# Patient Record
Sex: FEMALE | Race: ASIAN | NOT HISPANIC OR LATINO | Employment: OTHER | URBAN - METROPOLITAN AREA
[De-identification: names, ages, dates, MRNs, and addresses within clinical notes are randomized per-mention and may not be internally consistent; named-entity substitution may affect disease eponyms.]

---

## 2017-09-15 ENCOUNTER — HOSPITAL ENCOUNTER (EMERGENCY)
Facility: CLINIC | Age: 62
Discharge: HOME | End: 2017-09-15
Attending: EMERGENCY MEDICINE | Admitting: EMERGENCY MEDICINE
Payer: COMMERCIAL

## 2017-09-15 ENCOUNTER — TELEPHONE (OUTPATIENT)
Dept: OBGYN | Facility: CLINIC | Age: 62
End: 2017-09-15

## 2017-09-15 VITALS
SYSTOLIC BLOOD PRESSURE: 137 MMHG | HEIGHT: 60 IN | TEMPERATURE: 98.3 F | HEART RATE: 76 BPM | WEIGHT: 123 LBS | DIASTOLIC BLOOD PRESSURE: 70 MMHG | BODY MASS INDEX: 24.15 KG/M2 | RESPIRATION RATE: 16 BRPM | OXYGEN SATURATION: 96 %

## 2017-09-15 DIAGNOSIS — N76.4 LABIAL ABSCESS: ICD-10-CM

## 2017-09-15 PROCEDURE — 99284 EMERGENCY DEPT VISIT MOD MDM: CPT | Mod: 25

## 2017-09-15 PROCEDURE — 93005 ELECTROCARDIOGRAM TRACING: CPT

## 2017-09-15 PROCEDURE — 87077 CULTURE AEROBIC IDENTIFY: CPT | Performed by: EMERGENCY MEDICINE

## 2017-09-15 PROCEDURE — 87186 SC STD MICRODIL/AGAR DIL: CPT | Performed by: EMERGENCY MEDICINE

## 2017-09-15 PROCEDURE — 87070 CULTURE OTHR SPECIMN AEROBIC: CPT | Performed by: EMERGENCY MEDICINE

## 2017-09-15 PROCEDURE — 10060 I&D ABSCESS SIMPLE/SINGLE: CPT

## 2017-09-15 PROCEDURE — 25000128 H RX IP 250 OP 636: Performed by: EMERGENCY MEDICINE

## 2017-09-15 RX ORDER — LIDOCAINE HYDROCHLORIDE AND EPINEPHRINE 10; 10 MG/ML; UG/ML
INJECTION, SOLUTION INFILTRATION; PERINEURAL
Status: DISCONTINUED
Start: 2017-09-15 | End: 2017-09-15 | Stop reason: HOSPADM

## 2017-09-15 RX ORDER — AMPICILLIN AND SULBACTAM 2; 1 G/1; G/1
3 INJECTION, POWDER, FOR SOLUTION INTRAMUSCULAR; INTRAVENOUS ONCE
Status: COMPLETED | OUTPATIENT
Start: 2017-09-15 | End: 2017-09-15

## 2017-09-15 RX ORDER — OXYCODONE AND ACETAMINOPHEN 5; 325 MG/1; MG/1
1-2 TABLET ORAL EVERY 6 HOURS PRN
Qty: 20 TABLET | Refills: 0 | Status: SHIPPED | OUTPATIENT
Start: 2017-09-15 | End: 2017-09-22

## 2017-09-15 RX ORDER — POLYETHYLENE GLYCOL 3350 17 G/17G
1 POWDER, FOR SOLUTION ORAL DAILY
Qty: 527 G | Refills: 0 | Status: SHIPPED | OUTPATIENT
Start: 2017-09-15 | End: 2017-09-22

## 2017-09-15 RX ADMIN — AMPICILLIN SODIUM AND SULBACTAM SODIUM 3 G: 2; 1 INJECTION, POWDER, FOR SOLUTION INTRAMUSCULAR; INTRAVENOUS at 15:09

## 2017-09-15 ASSESSMENT — ENCOUNTER SYMPTOMS
WOUND: 1
FEVER: 0
ABDOMINAL PAIN: 0
DYSURIA: 0

## 2017-09-15 NOTE — ED NOTES
Pt has swollen area on left side of perineum for past week.  She was started on Cephalexin on Monday and the area continues to get worse.

## 2017-09-15 NOTE — LETTER
To Whom it may concern:      Hannah Jacome was seen in our Emergency Department today, 09/15/17.  I expect her condition to improve over the next 3 days.  she may return to work/school when improved.  Excuse from work 9-18-17 for medical reason.    Sincerely,  Griselda Hare RN

## 2017-09-15 NOTE — ED PROVIDER NOTES
History     Chief Complaint:  OBG    HPI   Hannah Jacome is a 62 year old female who presents to the emergency department today for evaluation of a swollen area/abscess on left side of her perineum, which was first noticed about one week ago. She was started on Cephalexin on Monday (4 days ago) and the area has continued to get worse. The patient reports that she has never had this before and denies ever having a skin infection in the past. She did have a fever on Monday, but has not had since then. The vaginal area is noted to be painful. No dysuria. No vaginal bleeding. No abdominal pain.    Allergies:  No Known Drug Allergies      Medications:    CEPHALEXIN PO  LISINOPRIL PO  ASPIRIN PO    Past Medical History:    Hypertension  No history of diabetes    Past Surgical History:     x 2  Hysterectomy  Cataract removal  Colonoscopy x 2    Family History:    History reviewed. No pertinent family history.      Social History:  The patient was accompanied to the ED alone.  Smoking Status: never  Alcohol Use: Yes   Marital Status:   [4]     Review of Systems   Constitutional: Negative for fever.   Gastrointestinal: Negative for abdominal pain.   Genitourinary: Positive for vaginal pain. Negative for dysuria and vaginal bleeding.   Skin: Positive for wound (abscess on perineum).   All other systems reviewed and are negative.    Physical Exam     Patient Vitals for the past 24 hrs:   BP Temp Temp src Pulse Resp SpO2 Height Weight   09/15/17 1706 - - - - - 97 % - -   09/15/17 1705 - - - - - 96 % - -   09/15/17 1704 - - - - - 95 % - -   09/15/17 1703 - - - - - 96 % - -   09/15/17 1702 - - - - - 96 % - -   09/15/17 1701 - - - - - 96 % - -   09/15/17 1700 138/71 - - - - 99 % - -   09/15/17 1659 - - - - - 95 % - -   09/15/17 1658 - - - - - 96 % - -   09/15/17 1657 - - - - - 96 % - -   09/15/17 1656 - - - - - 97 % - -   09/15/17 1655 - - - - - 97 % - -   09/15/17 1654 - - - - - 96 % - -   09/15/17 1653 137/71 -  - - - 99 % - -   09/15/17 1436 159/81 98  F (36.7  C) Oral 76 16 99 % 1.524 m (5') 55.8 kg (123 lb)   09/15/17 1357 - - - - - 98 % - -      Physical Exam  General: Patient is alert and interactive when I enter the room  Head:  The scalp, face, and head appear normal  Eyes:  Conjunctivae are normal  ENT:    The nose is normal    Pinnae are normal    External acoustic canals are normal  Neck:  Trachea midline  CV:  Pulses are normal.    Resp:  No respiratory distress   Abdomen:      Soft, non-tender, non-distended  Musc:  Normal muscular tone    No major joint effusions    No asymmetric leg swelling    Good capillary refill noted  Skin:  No rash or lesions noted  Neuro:  Speech is normal and fluent. Face is symmetric.     Moving all extremities well.   : large left labial abscess with fluctuance noted and surrounding purulent drainage   Psych: Awake. Alert.  Normal affect.  Appropriate interactions.          Emergency Department Course     Procedures:    Procedure: Incision and Drainage     LOCATIONS:  Left labia majora    ANESTHESIA:  Local field block using Lidocaine 1% with epinephrine, total of 2 mLs    PREPARATION:  Cleansed with Betadine    PROCEDURE:  Area was incised with # 11 Blade ([place]) with a Single Straight incision.  Wound treatment included Purulent Drainage (copoious amounts)  Packing consisted of No Packing.  Appropriate dressing was applied to cover the area.    Patient Status: Patient tolerated the procedure well. There were no complications.      Interventions:  1509 Unasyn 3 g IV     Emergency Department Course:  Nursing notes and vitals reviewed.  1419 I entered the room.  1423 I performed an exam of the patient as documented above.   The patient received the above intervention(s).    1439 I spoke with Dr. Wiggins of the OB GYN service regarding patient's presentation and plan of care.   1508 aspiration procedure performed as noted above.   1622 I spoke with home healthcare regarding the  patient's plan of care. They feel that the patient can go home and return for wound check instead of home healthcare.   1711 the patient was rechecked and she was updated on her plan of care.    I discussed the treatment plan with the patient. They expressed understanding of this plan and consented to discharge. They will be discharged home with instructions for care and follow up. In addition, the patient will return to the emergency department if their symptoms persist, worsen, if new symptoms arise or if there is any concern.  All questions were answered.     Impression & Plan      Medical Decision Making:  Hannah Jacome is a 62 year old female who presents with left labial pain. She has signs of an abscess. It was quite large in nature so discussed with OB/GYN. They felt it was reasonable to perform an bedside I&D but start on augmentin and close follow-up. I&D performed and successfully expressed purulent drainage; see procedure note. No signs of serious infx like necrotizing fascitis or rapid cellulitis given fever curve, spread of erythema over past 24 hours, no crepitance to tissues, no sensation change to tissues.  Will need wound cares q day. Given the large area, patient will return tomorrow for a wound check in the ED and then follow-up with gynecology. Abx given as she has some erythema and concerns about cellulitis. Warning signs for wound given on discharge instructions and verbally; see d/c instructions.    Diagnosis:    ICD-10-CM   1. Labial abscess N76.4     Disposition:   The patient was discharged to home with the below medications to be taken at home as instructed.     Discharge Medications:  New Prescriptions    AMOXICILLIN-CLAVULANATE (AUGMENTIN) 875-125 MG PER TABLET    Take 1 tablet by mouth 2 times daily for 10 days    OXYCODONE-ACETAMINOPHEN (PERCOCET) 5-325 MG PER TABLET    Take 1-2 tablets by mouth every 6 hours as needed for moderate to severe pain    POLYETHYLENE GLYCOL (MIRALAX) POWDER     Take 17 g (1 capful) by mouth daily     Scribe Disclosure:  I, Keshawn Garcia, am serving as a scribe at 1:56 PM on 9/15/2017 to document services personally performed by Karina Dai MD, based on my observations and the provider's statements to me.   [facility] EMERGENCY DEPARTMENT       Karina Dai MD  09/15/17 0479

## 2017-09-15 NOTE — ED AVS SNAPSHOT
[facility] Emergency Department    [address]    [facility] [address]    Phone:  [phone]    Fax:  [phone]                                       Hannah Jacome   MRN: [id number]    Department:  [facility] Emergency Department   Date of Visit:  9/15/2017           Patient Information     Date Of Birth          1955        Your diagnoses for this visit were:     Labial abscess        You were seen by Karina Dai MD.      Follow-up Information     Follow up with [facility] Emergency Department In 1 day.    Specialty:  EMERGENCY MEDICINE    Contact information:    [address]  [facility] [address]  [phone]        Follow up with Michael Wiggins MD On 9/18/2017.    Specialty:  OB/Gyn    Contact information:    [address]  [facility] [phone]  [facility] [address]  [phone]          Discharge Instructions       Discharge Instructions  Boils or Abscesses, MRSA Skin infections    You have been treated today for a skin boil or abscess. A boil is an infection under the skin that causes a painful pus filled lump. Boils start when bacteria infect a hair follicle, the place where a hair starts to grow.  The most common places that boils develop are on the face, neck, armpits, breasts, groin and buttocks. When they are small they can often be treated at home, but they can grow quickly, become very painful, and require medical attention.    Many of these infections are staph infections. Staph is a type of bacteria that commonly lives on skin. As many as 1 out of 3 people have staph that lives on their skin. Usually, it causes no problems, but if there is a cut or scrape, it can cause an infection. You have been treated today for an infection thought to be caused by MRSA, (pronounced  mursa ) which stands for methicillin resistant staph aureus. MRSA can be very difficult to treat. The antibiotics that were once used for skin  infections do not work on MRSA, so alternative medications must be prescribed.    Return to the Emergency Department if:    Your redness, pain, or swelling gets a lot worse.    You are unable to get your antibiotics, or are vomiting them up or you can t take them.    You are feeling more ill, weak or lightheaded.    You start to run a new fever (temperature >101).    Anything else about the infection worries or concerns you.  Treatment:    Incision and drainage (opening the boil with a scalpel to help the pus drain) or needle aspiration (removing pus with a syringe) is sometimes needed for larger abscesses. A wick or packing is sometimes put in the wound to encourage ongoing drainage of infection from the area.  Please leave it in place for as long as instructed by your care provider or until your follow up wound check in 48 hours.    Start your antibiotics right away, and take them as prescribed. Be sure to finish the whole prescription, even if you are better.    Apply a heating pad, warm packs, or warm water soaks to the infected area for 15 minutes at a time, at least 3 times a day. Do not use a heating pad on your feet or legs if you have diabetes. Do not sleep with a heating pad on, since this can cause burns or skin injury.    Raise the affected area above the level of your heart as much as possible in the first 1-2 days.    Pain medication - You may take a pain medication such as Tylenol  (acetaminophen), Advil  (ibuprofen), Nuprin  (ibuprofen) or Aleve  (naproxen).  If you have been given a narcotic such as Vicodin  (hydrocodone with acetaminophen), Percocet  (oxycodone with acetaminophen), or codeine, do not drive for four hours after you have taken it. If the narcotic contains Tylenol  (acetaminophen), do not take Tylenol  with it. All narcotics will cause constipation, so eat a high fiber diet.              Information about MRSA    How do you catch MRSA?    By touching a person who has MRSA on his or  her skin.    By being nearby when a person with MRSA breathes, coughs, or sneezes.    By touching a table, handle or other surface that has the germ on it.    If the germ is on your skin and you cut yourself or have another injury, you can get infected.    How do I know if I have a MRSA infection? MRSA most commonly causes skin infections such as boils, red tender lumps that contain pus. Your physician may recognize MRSA from the appearance of your infection. Sometimes, your doctor may swab your skin or the drainage from a boil to test for MRSA or other bacteria.    Can MRSA be treated? Yes, certain medications are still effective in treating MRSA infections.  It is very important that you follow the directions exactly. Take ALL the pills you are given, even if you feel better before you finish the pills. If you do not take them all, the germ could come back even stronger and be harder to treat next time.  Is there any way to prevent MRSA?     Wash your hands frequently with soap and water.    Do not share towels, washcloths, razors or other personal care items.    Wipe down gym equipment before and after you use it.    If you develop a similar infection in the future, you can try to treat it at home:    Apply warm compresses to the affected area to promote drainage.    Wash hands frequently to prevent spread of infection.    Keep affected area covered to prevent spread of infection.    Never squeeze or pop boils.     When to seek medical attention for boils:    You develop a fever.    The area around the boil becomes red or red streaks develop.    Your pain becomes severe.    You develop swollen lymph nodes.    You have diabetes, a heart murmur, an immune disease like HIV or AIDS, you take corticosteroids for a medical condition, or you are on chemotherapy.    You develop a boil or abscess on your face, near your spine or near your rectal opening.  Probiotics: If you have been given an antibiotic, you may want to  "also take a probiotic pill or eat yogurt with live cultures. Probiotics have \"good bacteria\" to help your intestines stay healthy. Studies have shown that probiotics help prevent diarrhea and other intestine problems (including C. diff infection) when you take antibiotics. You can buy these without a prescription in the pharmacy section of the store.   If you were given a prescription for medicine here today, be sure to read all of the information (including the package insert) that comes with your prescription.  This will include important information about the medicine, its side effects, and any warnings that you need to know about.  The pharmacist who fills the prescription can provide more information and answer questions you may have about the medicine.  If you have questions or concerns that the pharmacist cannot address, please call or return to the Emergency Department.   Opioid Medication Information    Pain medications are among the most commonly prescribed medicines, so we are including this information for all our patients. If you did not receive pain medication or get a prescription for pain medicine, you can ignore it.     You may have been given a prescription for an opioid (narcotic) pain medicine and/or have received a pain medicine while here in the Emergency Department. These medicines can make you drowsy or impaired. You must not drive, operate dangerous equipment, or engage in any other dangerous activities while taking these medications. If you drive while taking these medications, you could be arrested for DUI, or driving under the influence. Do not drink any alcohol while you are taking these medications.     Opioid pain medications can cause addiction. If you have a history of chemical dependency of any type, you are at a higher risk of becoming addicted to pain medications.  Only take these prescribed medications to treat your pain when all other options have been tried. Take it for as " short a time and as few doses as possible. Store your pain pills in a secure place, as they are frequently stolen and provide a dangerous opportunity for children or visitors in your house to start abusing these powerful medications. We will not replace any lost or stolen medicine.  As soon as your pain is better, you should flush all your remaining medication.     Many prescription pain medications contain Tylenol  (acetaminophen), including Vicodin , Tylenol #3 , Norco , Lortab , and Percocet .  You should not take any extra pills of Tylenol  if you are using these prescription medications or you can get very sick.  Do not ever take more than 3000 mg of acetaminophen in any 24 hour period.    All opioids tend to cause constipation. Drink plenty of water and eat foods that have a lot of fiber, such as fruits, vegetables, prune juice, apple juice and high fiber cereal.  Take a laxative if you don t move your bowels at least every other day. Miralax , Milk of Magnesia, Colace , or Senna  can be used to keep you regular.      Remember that you can always come back to the Emergency Department if you are not able to see your regular doctor in the amount of time listed above, if you get any new symptoms, or if there is anything that worries you.      24 Hour Appointment Hotline       To make an appointment at any [facility], call [phone] ([phone]). If you don't have a family doctor or clinic, we will help you find one. [place] clinics are conveniently located to serve the needs of you and your family.             Review of your medicines      START taking        Dose / Directions Last dose taken    amoxicillin-clavulanate 875-125 MG per tablet   Commonly known as:  AUGMENTIN   Dose:  1 tablet   Quantity:  20 tablet        Take 1 tablet by mouth 2 times daily for 10 days   Refills:  0        oxyCODONE-acetaminophen 5-325 MG per tablet   Commonly known as:  PERCOCET   Dose:  1-2 tablet   Quantity:  20  tablet        Take 1-2 tablets by mouth every 6 hours as needed for moderate to severe pain   Refills:  0        polyethylene glycol powder   Commonly known as:  MIRALAX   Dose:  1 capful   Quantity:  527 g        Take 17 g (1 capful) by mouth daily   Refills:  0          Our records show that you are taking the medicines listed below. If these are incorrect, please call your family doctor or clinic.        Dose / Directions Last dose taken    ASPIRIN PO   Dose:  81 mg        Take 81 mg by mouth daily   Refills:  0        CENTRUM SILVER per tablet   Dose:  1 tablet        Take 1 tablet by mouth daily   Refills:  0        CEPHALEXIN PO        Refills:  0        LISINOPRIL PO   Dose:  20 mg        Take 20 mg by mouth daily   Refills:  0                Prescriptions were sent or printed at these locations (3 Prescriptions)                   Other Prescriptions                Printed at Department/Unit printer (3 of 3)         amoxicillin-clavulanate (AUGMENTIN) 875-125 MG per tablet               oxyCODONE-acetaminophen (PERCOCET) 5-325 MG per tablet               polyethylene glycol (MIRALAX) powder                Procedures and tests performed during your visit     Wound Culture Aerobic Bacterial      Orders Needing Specimen Collection     None      Pending Results     Date and Time Order Name Status Description    9/15/2017 1603 Wound Culture Aerobic Bacterial In process             Pending Culture Results     Date and Time Order Name Status Description    9/15/2017 1603 Wound Culture Aerobic Bacterial In process             Pending Results Instructions     If you had any lab results that were not finalized at the time of your Discharge, you can call the ED Lab Result RN at [phone]. You will be contacted by this team for any positive Lab results or changes in treatment. The nurses are available 7 days a week from 10A to 6:30P.  You can leave a message 24 hours per day and they will return your call.        Test  Results From Your Hospital Stay        9/15/2017  4:23 PM                Clinical Quality Measure: Blood Pressure Screening     Your blood pressure was checked while you were in the emergency department today. The last reading we obtained was  BP: 138/71 . Please read the guidelines below about what these numbers mean and what you should do about them.  If your systolic blood pressure (the top number) is less than 120 and your diastolic blood pressure (the bottom number) is less than 80, then your blood pressure is normal. There is nothing more that you need to do about it.  If your systolic blood pressure (the top number) is 120-139 or your diastolic blood pressure (the bottom number) is 80-89, your blood pressure may be higher than it should be. You should have your blood pressure rechecked within a year by a primary care provider.  If your systolic blood pressure (the top number) is 140 or greater or your diastolic blood pressure (the bottom number) is 90 or greater, you may have high blood pressure. High blood pressure is treatable, but if left untreated over time it can put you at risk for heart attack, stroke, or kidney failure. You should have your blood pressure rechecked by a primary care provider within the next 4 weeks.  If your provider in the emergency department today gave you specific instructions to follow-up with your doctor or provider even sooner than that, you should follow that instruction and not wait for up to 4 weeks for your follow-up visit.        Thank you for choosing [facility]       Thank you for choosing [facility] for your care. Our goal is always to provide you with excellent care. Hearing back from our patients is one way we can continue to improve our services. Please take a few minutes to complete the written survey that you may receive in the mail after you visit with us. Thank you!        [facility] Information     [facility] lets you send messages to your doctor, view your test results,  "renew your prescriptions, schedule appointments and more. To sign up, go to [email or web address] . Click on \"Log in\" on the left side of the screen, which will take you to the Welcome page. Then click on \"Sign up Now\" on the right side of the page.     You will be asked to enter the access code listed below, as well as some personal information. Please follow the directions to create your username and password.     Your access code is: [email or web address]  Expires: 2017  5:27 PM     Your access code will  in 90 days. If you need help or a new code, please call your [facility] clinic or [phone].        Care EveryWhere ID     This is your Care EveryWhere ID. This could be used by other organizations to access your [facility] medical records  [id number]        Equal Access to Services     LOBO DENT : Makenzie Mead, adela manuel, carmen farr, lovely valenzuela . So [facility] [phone].    ATENCIÓN: Si habla español, tiene a jaimes disposición servicios gratuitos de asistencia lingüística. Llame al [phone].    We comply with applicable federal civil rights laws and [place] laws. We do not discriminate on the basis of race, color, national origin, age, disability sex, sexual orientation or gender identity.            After Visit Summary       This is your record. Keep this with you and show to your community pharmacist(s) and doctor(s) at your next visit.                  "

## 2017-09-15 NOTE — DISCHARGE INSTRUCTIONS
Discharge Instructions  Boils or Abscesses, MRSA Skin infections    You have been treated today for a skin boil or abscess. A boil is an infection under the skin that causes a painful pus filled lump. Boils start when bacteria infect a hair follicle, the place where a hair starts to grow.  The most common places that boils develop are on the face, neck, armpits, breasts, groin and buttocks. When they are small they can often be treated at home, but they can grow quickly, become very painful, and require medical attention.    Many of these infections are staph infections. Staph is a type of bacteria that commonly lives on skin. As many as 1 out of 3 people have staph that lives on their skin. Usually, it causes no problems, but if there is a cut or scrape, it can cause an infection. You have been treated today for an infection thought to be caused by MRSA, (pronounced  mursa ) which stands for methicillin resistant staph aureus. MRSA can be very difficult to treat. The antibiotics that were once used for skin infections do not work on MRSA, so alternative medications must be prescribed.    Return to the Emergency Department if:    Your redness, pain, or swelling gets a lot worse.    You are unable to get your antibiotics, or are vomiting them up or you can t take them.    You are feeling more ill, weak or lightheaded.    You start to run a new fever (temperature >101).    Anything else about the infection worries or concerns you.  Treatment:    Incision and drainage (opening the boil with a scalpel to help the pus drain) or needle aspiration (removing pus with a syringe) is sometimes needed for larger abscesses. A wick or packing is sometimes put in the wound to encourage ongoing drainage of infection from the area.  Please leave it in place for as long as instructed by your care provider or until your follow up wound check in 48 hours.    Start your antibiotics right away, and take them as prescribed. Be sure to  finish the whole prescription, even if you are better.    Apply a heating pad, warm packs, or warm water soaks to the infected area for 15 minutes at a time, at least 3 times a day. Do not use a heating pad on your feet or legs if you have diabetes. Do not sleep with a heating pad on, since this can cause burns or skin injury.    Raise the affected area above the level of your heart as much as possible in the first 1-2 days.    Pain medication - You may take a pain medication such as Tylenol  (acetaminophen), Advil  (ibuprofen), Nuprin  (ibuprofen) or Aleve  (naproxen).  If you have been given a narcotic such as Vicodin  (hydrocodone with acetaminophen), Percocet  (oxycodone with acetaminophen), or codeine, do not drive for four hours after you have taken it. If the narcotic contains Tylenol  (acetaminophen), do not take Tylenol  with it. All narcotics will cause constipation, so eat a high fiber diet.              Information about MRSA    How do you catch MRSA?    By touching a person who has MRSA on his or her skin.    By being nearby when a person with MRSA breathes, coughs, or sneezes.    By touching a table, handle or other surface that has the germ on it.    If the germ is on your skin and you cut yourself or have another injury, you can get infected.    How do I know if I have a MRSA infection? MRSA most commonly causes skin infections such as boils, red tender lumps that contain pus. Your physician may recognize MRSA from the appearance of your infection. Sometimes, your doctor may swab your skin or the drainage from a boil to test for MRSA or other bacteria.    Can MRSA be treated? Yes, certain medications are still effective in treating MRSA infections.  It is very important that you follow the directions exactly. Take ALL the pills you are given, even if you feel better before you finish the pills. If you do not take them all, the germ could come back even stronger and be harder to treat next time.  Is  "there any way to prevent MRSA?     Wash your hands frequently with soap and water.    Do not share towels, washcloths, razors or other personal care items.    Wipe down gym equipment before and after you use it.    If you develop a similar infection in the future, you can try to treat it at home:    Apply warm compresses to the affected area to promote drainage.    Wash hands frequently to prevent spread of infection.    Keep affected area covered to prevent spread of infection.    Never squeeze or pop boils.     When to seek medical attention for boils:    You develop a fever.    The area around the boil becomes red or red streaks develop.    Your pain becomes severe.    You develop swollen lymph nodes.    You have diabetes, a heart murmur, an immune disease like HIV or AIDS, you take corticosteroids for a medical condition, or you are on chemotherapy.    You develop a boil or abscess on your face, near your spine or near your rectal opening.  Probiotics: If you have been given an antibiotic, you may want to also take a probiotic pill or eat yogurt with live cultures. Probiotics have \"good bacteria\" to help your intestines stay healthy. Studies have shown that probiotics help prevent diarrhea and other intestine problems (including C. diff infection) when you take antibiotics. You can buy these without a prescription in the pharmacy section of the store.   If you were given a prescription for medicine here today, be sure to read all of the information (including the package insert) that comes with your prescription.  This will include important information about the medicine, its side effects, and any warnings that you need to know about.  The pharmacist who fills the prescription can provide more information and answer questions you may have about the medicine.  If you have questions or concerns that the pharmacist cannot address, please call or return to the Emergency Department.   Opioid Medication " Information    Pain medications are among the most commonly prescribed medicines, so we are including this information for all our patients. If you did not receive pain medication or get a prescription for pain medicine, you can ignore it.     You may have been given a prescription for an opioid (narcotic) pain medicine and/or have received a pain medicine while here in the Emergency Department. These medicines can make you drowsy or impaired. You must not drive, operate dangerous equipment, or engage in any other dangerous activities while taking these medications. If you drive while taking these medications, you could be arrested for DUI, or driving under the influence. Do not drink any alcohol while you are taking these medications.     Opioid pain medications can cause addiction. If you have a history of chemical dependency of any type, you are at a higher risk of becoming addicted to pain medications.  Only take these prescribed medications to treat your pain when all other options have been tried. Take it for as short a time and as few doses as possible. Store your pain pills in a secure place, as they are frequently stolen and provide a dangerous opportunity for children or visitors in your house to start abusing these powerful medications. We will not replace any lost or stolen medicine.  As soon as your pain is better, you should flush all your remaining medication.     Many prescription pain medications contain Tylenol  (acetaminophen), including Vicodin , Tylenol #3 , Norco , Lortab , and Percocet .  You should not take any extra pills of Tylenol  if you are using these prescription medications or you can get very sick.  Do not ever take more than 3000 mg of acetaminophen in any 24 hour period.    All opioids tend to cause constipation. Drink plenty of water and eat foods that have a lot of fiber, such as fruits, vegetables, prune juice, apple juice and high fiber cereal.  Take a laxative if you don t  move your bowels at least every other day. Miralax , Milk of Magnesia, Colace , or Senna  can be used to keep you regular.      Remember that you can always come back to the Emergency Department if you are not able to see your regular doctor in the amount of time listed above, if you get any new symptoms, or if there is anything that worries you.

## 2017-09-15 NOTE — ED AVS SNAPSHOT
[facility] Emergency Department    [address]    [facility] [address]    Phone:  [phone]    Fax:  [phone]                                       Hannah Jacome   MRN: [id number]    Department:  [facility] Emergency Department   Date of Visit:  9/15/2017           After Visit Summary Signature Page     I have received my discharge instructions, and my questions have been answered. I have discussed any challenges I see with this plan with the nurse or doctor.    ..........................................................................................................................................  Patient/Patient Representative Signature      ..........................................................................................................................................  Patient Representative Print Name and Relationship to Patient    ..................................................               ................................................  Date                                            Time    ..........................................................................................................................................  Reviewed by Signature/Title    ...................................................              ..............................................  Date                                                            Time

## 2017-09-16 NOTE — TELEPHONE ENCOUNTER
Patient was discharged today and instructed to schedule a hospital follow up with Dr. Wiggins on Monday. She was seen for labial abscess. Please call to see if she can be worked in.    Suzan VASQUEZ  Central Scheduler

## 2017-09-17 ENCOUNTER — TELEPHONE (OUTPATIENT)
Dept: EMERGENCY MEDICINE | Facility: CLINIC | Age: 62
End: 2017-09-17

## 2017-09-17 LAB
BACTERIA SPEC CULT: ABNORMAL
Lab: ABNORMAL
SPECIMEN SOURCE: ABNORMAL

## 2017-09-17 NOTE — TELEPHONE ENCOUNTER
[facility] Emergency Department Lab result notification:    [facility] ED lab result protocol used  General Culture Protocol - Wound  Reason for call  Notify of lab results, assess symptoms,  review ED providers recommendations/discharge instructions (if necessary) and advise per ED lab result f/u protocol    Lab Result  Final Wound culture report on 09/17/2017  [facility] ED discharge antibiotic: Amoxicillin-Clavulanate (Augmentin) 875-125 mg PO tablet,  Take 1 tablet by mouth 2 times daily for 10 days  #1. Bacteria, Heavy growth Escherichia coli, which is SUSCEPTIBLE to ED discharge antibiotic  Incision and Drainage performed in [facility] [Yes / No]: Yes  Patient to be notified of result, symptoms's assessed and advised per [facility] ED lab result protocol.  Information table from ED Provider visit on 09/15/2017  Symptoms reported at ED visit (Chief complaint, HPI) a 62 year old female who presents to the emergency department today for evaluation of a swollen area/abscess on left side of her perineum, which was first noticed about one week ago. She was started on Cephalexin on Monday (4 days ago) and the area has continued to get worse. The patient reports that she has never had this before and denies ever having a skin infection in the past. She did have a fever on Monday, but has not had since then. The vaginal area is noted to be painful. No dysuria. No vaginal bleeding. No abdominal pain.   ED providers Impression and Plan (applicable information)  a 62 year old female who presents with left labial pain. She has signs of an abscess. It was quite large in nature so discussed with OB/GYN. They felt it was reasonable to perform an bedside I&D but start on augmentin and close follow-up. I&D performed and successfully expressed purulent drainage; see procedure note. No signs of serious infx like necrotizing fascitis or rapid cellulitis given fever curve, spread of erythema over past 24 hours, no crepitance to  tissues, no sensation change to tissues.  Will need wound cares q day. Given the large area, patient will return tomorrow for a wound check in the ED and then follow-up with gynecology. Abx given as she has some erythema and concerns about cellulitis. Warning signs for wound given on discharge instructions and verbally; see d/c instructions   Miscellaneous information Follow up with Michael Wiggins MD On 9/18/2017.     RN Assessment (Patient s current Symptoms), include time called.  [Insert Left message here if message left]  At 1445 Left voicemail message requesting a call back to [phone] between 10 a.m. and 6:30 p.m., 7 days a week for patient's ED/UC lab results.   Recommendations/Instructions per [facility] lab result protocol  Continue antibiotic as prescribe in its entirety, follow up as recommended and     Please Contact your PCP clinic or return to the Emergency department if your:    Symptoms do not resolve after completing antibiotic.    Symptoms worsen or other concerning symptom's.    PCP follow-up Questions asked: YES       Zena Barrett RN  [facility] RN  Lung Nodule and ED Lab Result F/u RN  Epic pool (ED late result f/u RN): [id number]  [facility] INCIDENTAL RADIOLOGY F/U NURSES: [id number]  Ph# [phone]

## 2017-09-18 ENCOUNTER — TELEPHONE (OUTPATIENT)
Dept: OBGYN | Facility: CLINIC | Age: 62
End: 2017-09-18

## 2017-09-18 NOTE — TELEPHONE ENCOUNTER
Patient called today.    Patient believes that she is needing an appointment to be seen today with Michael Cotton MD at [facility].    She is unclear however, as to why she was asked to see this provider today.  Left a voicemail.    Is it regards to a boil?    Please contact patient.    Thank you.    Central Scheduling  Mary LITTLE

## 2017-09-20 NOTE — TELEPHONE ENCOUNTER
Patient was to have ER follow up with Dr Wiggins on Mon.  Dr Dai (ER physician who treated pt) called regarding patient and asked to get her in ASAP.  Call made to patient:  Patient states tried to call the office x 2 and left messages and no one called her back.  Offered appt at the [facility] 9/21, unable to make that, offered [facility] office now, declined.  Scheduled with Dr Schilling 9/22/17  Silvia Rashid RN

## 2017-09-22 ENCOUNTER — OFFICE VISIT (OUTPATIENT)
Dept: OBGYN | Facility: CLINIC | Age: 62
End: 2017-09-22
Payer: COMMERCIAL

## 2017-09-22 VITALS
BODY MASS INDEX: 24.18 KG/M2 | HEART RATE: 56 BPM | WEIGHT: 123.8 LBS | SYSTOLIC BLOOD PRESSURE: 142 MMHG | DIASTOLIC BLOOD PRESSURE: 70 MMHG

## 2017-09-22 DIAGNOSIS — N76.4 VULVAR ABSCESS: Primary | ICD-10-CM

## 2017-09-22 PROCEDURE — 99203 OFFICE O/P NEW LOW 30 MIN: CPT | Performed by: OBSTETRICS & GYNECOLOGY

## 2017-09-22 NOTE — NURSING NOTE
Chief Complaint   Patient presents with     ER F/U     Seen in [facility] ED 9/15/17       Initial /70 (BP Location: Left arm, Patient Position: Sitting, Cuff Size: Adult Regular)  Pulse 56  Wt 123 lb 12.8 oz (56.2 kg)  BMI 24.18 kg/m2 Estimated body mass index is 24.18 kg/(m^2) as calculated from the following:    Height as of 9/15/17: 5' (1.524 m).    Weight as of this encounter: 123 lb 12.8 oz (56.2 kg).  BP completed using cuff size: regular    No obstetric history on file.    The following HM Due: mammogram  pap smear  colonoscopy/FIT  Vaccinations: flu      The following patient reported/Care Every where data was sent to:  P ABSTRACT QUALITY INITIATIVES [[id number]]  NA     Pt declines to have any testing with [facility] as she wants to continue care with [facility].

## 2017-09-22 NOTE — MR AVS SNAPSHOT
"              After Visit Summary   2017    Hannah Jacome    MRN: [id number]           Patient Information     Date Of Birth          1955        Visit Information        Provider Department      2017 10:00 AM Josephine Schilling MD [facility]        Today's Diagnoses     Vulvar abscess    -  1       Follow-ups after your visit        Who to contact     If you have questions or need follow up information about today's clinic visit or your schedule please contact [facility] directly at [phone].  Normal or non-critical lab and imaging results will be communicated to you by MyChart, letter or phone within 4 business days after the clinic has received the results. If you do not hear from us within 7 days, please contact the clinic through [employer] or phone. If you have a critical or abnormal lab result, we will notify you by phone as soon as possible.  Submit refill requests through [employer] or call your pharmacy and they will forward the refill request to us. Please allow 3 business days for your refill to be completed.          Additional Information About Your Visit        MyChart Information     [facility] lets you send messages to your doctor, view your test results, renew your prescriptions, schedule appointments and more. To sign up, go to [email or web address] . Click on \"Log in\" on the left side of the screen, which will take you to the Welcome page. Then click on \"Sign up Now\" on the right side of the page.     You will be asked to enter the access code listed below, as well as some personal information. Please follow the directions to create your username and password.     Your access code is: [email or web address]  Expires: 2017  5:27 PM     Your access code will  in 90 days. If you need help or a new code, please call your [facility] or [phone].        Care EveryWhere ID     This is your Care EveryWhere ID. This could be used by other " organizations to access your [facility] medical records  [id number]        Your Vitals Were     Pulse BMI (Body Mass Index)                56 24.18 kg/m2           Blood Pressure from Last 3 Encounters:   09/22/17 142/70   09/15/17 137/70   12/07/16 176/85    Weight from Last 3 Encounters:   09/22/17 123 lb 12.8 oz (56.2 kg)   09/15/17 123 lb (55.8 kg)   12/07/16 120 lb (54.4 kg)              Today, you had the following     No orders found for display         Today's Medication Changes          These changes are accurate as of: 9/22/17  8:16 PM.  If you have any questions, ask your nurse or doctor.               Stop taking these medicines if you haven't already. Please contact your care team if you have questions.     cephalexin 250 MG capsule   Commonly known as:  KEFLEX   Stopped by:  Josephine Schilling MD           CEPHALEXIN PO   Stopped by:  Josephine Schilling MD                    Primary Care Provider Office Phone # Fax #    Tristian Smyth [phone]       [facility] [address] [facility] [address]        Equal Access to Services     [facility] AH: Hadii aad michael hadasho Sodanyellali, waaxda luqadaha, qaybta kaalmada adeegyada, lovely valenzuela . So [facility] [phone].    ATENCIÓN: Si habla español, tiene a jaimes disposición servicios gratuitos de asistencia lingüística. Llame al [phone].    We comply with applicable federal civil rights laws and [place] laws. We do not discriminate on the basis of race, color, national origin, age, disability sex, sexual orientation or gender identity.            Thank you!     Thank you for choosing [facility]  for your care. Our goal is always to provide you with excellent care. Hearing back from our patients is one way we can continue to improve our services. Please take a few minutes to complete the written survey that you may receive in the mail after your visit with us. Thank  you!             Your Updated Medication List - Protect others around you: Learn how to safely use, store and throw away your medicines at www.disposemymeds.org.          This list is accurate as of: 9/22/17  8:16 PM.  Always use your most recent med list.                   Brand Name Dispense Instructions for use Diagnosis    amoxicillin-clavulanate 875-125 MG per tablet    AUGMENTIN    20 tablet    Take 1 tablet by mouth 2 times daily for 10 days        ASPIRIN PO      Take 81 mg by mouth daily        CENTRUM SILVER per tablet      Take 1 tablet by mouth daily        LISINOPRIL PO      Take 20 mg by mouth daily

## 2017-09-22 NOTE — PROGRESS NOTES
SUBJECTIVE: Hannah Jacome is a 62 year old female here for follow-up of ER visit.      9/15/17 ER note:  Hannah Jacome is a 62 year old female who presents with left labial pain. She has signs of an abscess. It was quite large in nature so discussed with OB/GYN. They felt it was reasonable to perform an bedside I&D but start on augmentin and close follow-up. I&D performed and successfully expressed purulent drainage; see procedure note. No signs of serious infx like necrotizing fascitis or rapid cellulitis given fever curve, spread of erythema over past 24 hours, no crepitance to tissues, no sensation change to tissues.  Will need wound cares q day. Given the large area, patient will return tomorrow for a wound check in the ED and then follow-up with gynecology. Abx given as she has some erythema and concerns about cellulitis. Warning signs for wound given on discharge instructions and verbally.    Patient states the ride side of her vulva is still enlarged; pain improved.  Denies pain with sitting, walking.    She is still taking her antibiotic.  Patient notes she has not had a fever since she started Cephalexin on 9/11/17.          Review Of Systems  Skin: negative  Eyes: negative  Ears/Nose/Throat: negative  Respiratory: No shortness of breath, dyspnea on exertion, cough, or hemoptysis  Cardiovascular: negative  Gastrointestinal: negative  Genitourinary: + swelling of vulva  Musculoskeletal: negative  Neurologic: negative  Psychiatric: negative  Hematologic/Lymphatic/Immunologic: negative  Endocrine: negative .ros    This document serves as a record of the services and decisions personally performed and made by Josephine Schilling MD. It was created on her behalf by Laura Overton, a trained medical scribe. The creation of this document is based the provider's statements to the medical scribe.  Laura Overton September 22, 2017 10:14 AM        OBJECTIVE: /70 (BP Location: Left arm, Patient Position: Sitting, Cuff  Size: Adult Regular)  Pulse 56  Wt 56.2 kg (123 lb 12.8 oz)  BMI 24.18 kg/m2  Physical exam:  GENERAL APPEARANCE: healthy, alert and no distress  PELVIC:   Vulva: non-erythematous, pinpoint small incision inside left labia minora noted, closed, without draining.    Slight edema, no fluctuance palpated, non-tender to palpation.   Urethra meatus: normal, non-tender      ASSESSMENT/PLAN:   (N76.4) Vulvar abscess  (primary encounter diagnosis)  Comment:  status post I&D in ER, healing well.    Incision site has sealed, no active drainage, but no persistent fluctuance palpated.   Slight swelling likely from resolving inflammatory changes; no erythema, non-tender.    Instructed patient to complete antibiotics, follow-up if any worsening symptoms.   Reviewed signs of infection.       The information in this document, created by the medical scribe for me, accurately reflects the services I personally performed and the decisions made by me. I have reviewed and approved this document for accuracy prior to leaving the patient care area.  9/22/2017 10:14 AM     Josephine Schilling M.D.

## 2022-10-16 ENCOUNTER — RESULTS ONLY (OUTPATIENT)
Dept: ADMINISTRATIVE | Facility: CLINIC | Age: 67
End: 2022-10-16

## 2022-10-16 ENCOUNTER — HOSPITAL ENCOUNTER (EMERGENCY)
Facility: CLINIC | Age: 67
Discharge: HOME | End: 2022-10-17
Attending: EMERGENCY MEDICINE | Admitting: EMERGENCY MEDICINE
Payer: COMMERCIAL

## 2022-10-16 DIAGNOSIS — I10 ASYMPTOMATIC HYPERTENSION: ICD-10-CM

## 2022-10-16 LAB
ANION GAP SERPL CALCULATED.3IONS-SCNC: 9 MMOL/L (ref 7–15)
BASOPHILS # BLD AUTO: 0 10E3/UL (ref 0–0.2)
BASOPHILS NFR BLD AUTO: 0 %
BUN SERPL-MCNC: 9.9 MG/DL (ref 8–23)
CALCIUM SERPL-MCNC: 9.4 MG/DL (ref 8.8–10.2)
CHLORIDE SERPL-SCNC: 106 MMOL/L (ref 98–107)
CREAT SERPL-MCNC: 0.61 MG/DL (ref 0.51–0.95)
DEPRECATED HCO3 PLAS-SCNC: 28 MMOL/L (ref 22–29)
EOSINOPHIL # BLD AUTO: 0.1 10E3/UL (ref 0–0.7)
EOSINOPHIL NFR BLD AUTO: 1 %
ERYTHROCYTE [DISTWIDTH] IN BLOOD BY AUTOMATED COUNT: 12.8 % (ref 10–15)
GFR SERPL CREATININE-BSD FRML MDRD: >90 ML/MIN/1.73M2
GLUCOSE SERPL-MCNC: 97 MG/DL (ref 70–99)
HCT VFR BLD AUTO: 47 % (ref 35–47)
HGB BLD-MCNC: 14.7 G/DL (ref 11.7–15.7)
IMM GRANULOCYTES # BLD: 0 10E3/UL
IMM GRANULOCYTES NFR BLD: 0 %
LYMPHOCYTES # BLD AUTO: 2.6 10E3/UL (ref 0.8–5.3)
LYMPHOCYTES NFR BLD AUTO: 37 %
MCH RBC QN AUTO: 29.4 PG (ref 26.5–33)
MCHC RBC AUTO-ENTMCNC: 31.3 G/DL (ref 31.5–36.5)
MCV RBC AUTO: 94 FL (ref 78–100)
MONOCYTES # BLD AUTO: 0.5 10E3/UL (ref 0–1.3)
MONOCYTES NFR BLD AUTO: 7 %
NEUTROPHILS # BLD AUTO: 3.9 10E3/UL (ref 1.6–8.3)
NEUTROPHILS NFR BLD AUTO: 55 %
NRBC # BLD AUTO: 0 10E3/UL
NRBC BLD AUTO-RTO: 0 /100
PLATELET # BLD AUTO: 204 10E3/UL (ref 150–450)
POTASSIUM SERPL-SCNC: 4.5 MMOL/L (ref 3.4–5.3)
RBC # BLD AUTO: 5 10E6/UL (ref 3.8–5.2)
SODIUM SERPL-SCNC: 143 MMOL/L (ref 136–145)
TROPONIN T SERPL HS-MCNC: 9 NG/L
TROPONIN T SERPL HS-MCNC: 9 NG/L
WBC # BLD AUTO: 7.2 10E3/UL (ref 4–11)

## 2022-10-16 PROCEDURE — 80048 BASIC METABOLIC PNL TOTAL CA: CPT | Performed by: EMERGENCY MEDICINE

## 2022-10-16 PROCEDURE — 99285 EMERGENCY DEPT VISIT HI MDM: CPT

## 2022-10-16 PROCEDURE — 250N000013 HC RX MED GY IP 250 OP 250 PS 637: Performed by: EMERGENCY MEDICINE

## 2022-10-16 PROCEDURE — 36415 COLL VENOUS BLD VENIPUNCTURE: CPT | Performed by: EMERGENCY MEDICINE

## 2022-10-16 PROCEDURE — 93005 ELECTROCARDIOGRAM TRACING: CPT

## 2022-10-16 PROCEDURE — 84484 ASSAY OF TROPONIN QUANT: CPT | Performed by: EMERGENCY MEDICINE

## 2022-10-16 PROCEDURE — 85025 COMPLETE CBC W/AUTO DIFF WBC: CPT | Performed by: EMERGENCY MEDICINE

## 2022-10-16 PROCEDURE — 93005 ELECTROCARDIOGRAM TRACING: CPT | Mod: 76

## 2022-10-16 RX ORDER — HYDROCHLOROTHIAZIDE 12.5 MG/1
12.5 TABLET ORAL DAILY
Qty: 30 TABLET | Refills: 0 | Status: SHIPPED | OUTPATIENT
Start: 2022-10-16 | End: 2022-11-15

## 2022-10-16 RX ORDER — HYDROCHLOROTHIAZIDE 12.5 MG/1
12.5 CAPSULE ORAL DAILY
Status: DISCONTINUED | OUTPATIENT
Start: 2022-10-17 | End: 2022-10-16

## 2022-10-16 RX ORDER — LISINOPRIL 10 MG/1
40 TABLET ORAL ONCE
Status: COMPLETED | OUTPATIENT
Start: 2022-10-16 | End: 2022-10-16

## 2022-10-16 RX ORDER — HYDROCHLOROTHIAZIDE 12.5 MG/1
12.5 CAPSULE ORAL ONCE
Status: COMPLETED | OUTPATIENT
Start: 2022-10-16 | End: 2022-10-16

## 2022-10-16 RX ADMIN — LISINOPRIL 40 MG: 10 TABLET ORAL at 20:41

## 2022-10-16 RX ADMIN — HYDROCHLOROTHIAZIDE 12.5 MG: 12.5 CAPSULE ORAL at 23:38

## 2022-10-16 ASSESSMENT — ENCOUNTER SYMPTOMS
VOMITING: 0
CHILLS: 0
SHORTNESS OF BREATH: 0
FEVER: 0
NAUSEA: 0
ABDOMINAL PAIN: 0
DYSURIA: 0
HEADACHES: 0

## 2022-10-16 ASSESSMENT — ACTIVITIES OF DAILY LIVING (ADL)
ADLS_ACUITY_SCORE: 35
ADLS_ACUITY_SCORE: 35

## 2022-10-17 VITALS
SYSTOLIC BLOOD PRESSURE: 213 MMHG | TEMPERATURE: 97.9 F | RESPIRATION RATE: 18 BRPM | HEART RATE: 46 BPM | OXYGEN SATURATION: 97 % | DIASTOLIC BLOOD PRESSURE: 76 MMHG

## 2022-10-17 LAB
ATRIAL RATE - MUSE: 96 BPM
ATRIAL RATE - MUSE: 96 BPM
DIASTOLIC BLOOD PRESSURE - MUSE: NORMAL MMHG
DIASTOLIC BLOOD PRESSURE - MUSE: NORMAL MMHG
INTERPRETATION ECG - MUSE: NORMAL
INTERPRETATION ECG - MUSE: NORMAL
P AXIS - MUSE: 38 DEGREES
P AXIS - MUSE: 38 DEGREES
PR INTERVAL - MUSE: 128 MS
PR INTERVAL - MUSE: 128 MS
QRS DURATION - MUSE: 90 MS
QRS DURATION - MUSE: 90 MS
QT - MUSE: 464 MS
QT - MUSE: 464 MS
QTC - MUSE: 419 MS
QTC - MUSE: 419 MS
R AXIS - MUSE: -16 DEGREES
R AXIS - MUSE: -16 DEGREES
SYSTOLIC BLOOD PRESSURE - MUSE: NORMAL MMHG
SYSTOLIC BLOOD PRESSURE - MUSE: NORMAL MMHG
T AXIS - MUSE: 23 DEGREES
T AXIS - MUSE: 23 DEGREES
VENTRICULAR RATE- MUSE: 49 BPM
VENTRICULAR RATE- MUSE: 49 BPM

## 2022-10-17 NOTE — DISCHARGE INSTRUCTIONS
"What do you do next:   Continue your home medications unless we have specifically changed them  I have also added a medicine called \"hydrochlorothiazide\".  You should take this as directed.  Follow up as indicated below (it is extremely important that you follow with your primary care clinic for further outpatient blood pressure monitoring).    When do you return: If you have chest pain, shortness of breath, headache, lightheadedness, fainting, dizziness, or any other symptoms that concern you, please return to the ED for reevaluation.    Thank you for allowing us to care for you today.    "

## 2022-10-17 NOTE — ED PROVIDER NOTES
"  History     Chief Complaint:  Hypertension      HPI   Hannah Jacome is a 67 year old female who presents with HTN. She takes 40 mg of lisinopril nightly. The patient noted 196/94, 204/87, and 187/85. She went to [facility] where an elevated blood pressure was noted and she was encouraged to come to the emergency department.  She has not taken her blood pressure medicine tonight.  The patient states that she checks her blood pressure daily.    She states \"I feel absolutely fine\".    Review of Systems   Constitutional: Negative for chills and fever.   Respiratory: Negative for shortness of breath.    Cardiovascular: Negative for chest pain.   Gastrointestinal: Negative for abdominal pain, nausea and vomiting.   Genitourinary: Negative for dysuria.   Neurological: Negative for headaches.   All other systems reviewed and are negative.      Allergies:  No Known Allergies    Medications:    hydrochlorothiazide (HYDRODIURIL) 12.5 MG tablet  ASPIRIN PO  LISINOPRIL PO  Multiple Vitamins-Minerals (CENTRUM SILVER) per tablet         Past Medical History:   Past Surgical History:     Past Medical History:   Diagnosis Date     Hypertension     Past Surgical History:   Procedure Laterality Date     c section x 2       COLONOSCOPY       COLONOSCOPY N/A 12/7/2016    Procedure: COLONOSCOPY;  Surgeon: Anthony Coreas MD;  Location:  GI     GYN SURGERY       HYSTERECTOMY        There are no problems to display for this patient.         Family History  Family History   Problem Relation Age of Onset     Colon Cancer No family hx of        Social History:  PCP: Tristian Smyth  Presents to the ED with her sister.    Physical Exam     Patient Vitals for the past 24 hrs:   BP Temp Temp src Pulse Resp SpO2   10/16/22 2345 (!) 213/76 -- -- (!) 46 18 97 %   10/16/22 2330 (!) 206/74 -- -- (!) 46 15 97 %   10/16/22 2315 (!) 200/68 -- -- (!) 49 18 97 %   10/16/22 2300 (!) 214/76 -- -- (!) 46 26 98 %   10/16/22 2245 -- -- -- (!) 43 17 98 %   10/16/22 " 2240 (!) 190/74 -- -- (!) 44 15 99 %   10/16/22 2230 -- -- -- (!) 43 22 99 %   10/16/22 2229 (!) 212/73 -- -- (!) 43 24 98 %   10/16/22 2214 (!) 216/84 -- -- (!) 47 18 97 %   10/16/22 2200 -- -- -- (!) 45 17 98 %   10/16/22 2158 (!) 205/75 -- -- (!) 46 15 97 %   10/16/22 2145 -- -- -- (!) 45 19 98 %   10/16/22 2143 (!) 204/76 -- -- (!) 43 17 97 %   10/16/22 2131 -- -- -- (!) 43 18 97 %   10/16/22 2130 (!) 201/74 -- -- (!) 42 -- --   10/16/22 2115 (!) 209/75 -- -- (!) 44 19 97 %   10/16/22 2100 -- -- -- (!) 47 27 99 %   10/16/22 2056 (!) 202/82 -- -- (!) 48 21 99 %   10/16/22 2045 (!) 213/81 -- -- (!) 45 17 99 %   10/16/22 2043 (!) 214/84 -- -- 54 (!) 32 99 %   10/16/22 2030 -- -- -- (!) 47 12 98 %   10/16/22 2014 (!) 233/85 97.9  F (36.6  C) Temporal (!) 49 20 98 %       Physical Exam  Constitutional: Vital signs reviewed as above.   Head: No external signs of trauma noted.  Eyes: Pupils are equal, round, and reactive to light.   Neck: No JVD noted  Cardiovascular: bradycardic rate, Regular rhythm and normal heart sounds.  No murmur heard. Equal B/L peripheral pulses.  Pulmonary/Chest: Effort normal and breath sounds normal. No respiratory distress. Patient has no wheezes. Patient has no rales.   Gastrointestinal: Soft. There is no tenderness.   Musculoskeletal/Extremities: No pitting edema noted. Normal tone.  Neurological: Patient is alert and oriented to person, place, and time.   Skin: Skin is warm and dry. There is no diaphoresis noted.   Psychiatric: The patient appears calm.        Emergency Department Course   ECG:  ECG taken at 2020, ECG read at 2022  Sinus rhythm with second-degree AV block with 2-1 AV conduction (I think this is an over read and the patient is in sinus rhythm)  Abnormal ECG  No old ECG available for comparison  Rate 49 bpm. WI interval 128 ms. QRS duration 90 ms. QT/QTc 464/419 ms. P-R-T axes 38 -16 23.     ECG #2  ECG taken at 2210, ECG read at 2217  Sinus bradycardia  T wave  abnormality, consider anterior ischemia  Abnormal ECG    No significant change as compared to prior, dated 10/16/2022 @2020 .  Rate 47 bpm. ME interval 132 ms. QRS duration 86 ms. QT/QTc 450/398 ms. P-R-T axes 72 -12 9.         Imaging:  No orders to display       Laboratory:  Labs Ordered and Resulted from Time of ED Arrival to Time of ED Departure   CBC WITH PLATELETS AND DIFFERENTIAL - Abnormal       Result Value    WBC Count 7.2      RBC Count 5.00      Hemoglobin 14.7      Hematocrit 47.0      MCV 94      MCH 29.4      MCHC 31.3 (*)     RDW 12.8      Platelet Count 204      % Neutrophils 55      % Lymphocytes 37      % Monocytes 7      % Eosinophils 1      % Basophils 0      % Immature Granulocytes 0      NRBCs per 100 WBC 0      Absolute Neutrophils 3.9      Absolute Lymphocytes 2.6      Absolute Monocytes 0.5      Absolute Eosinophils 0.1      Absolute Basophils 0.0      Absolute Immature Granulocytes 0.0      Absolute NRBCs 0.0     BASIC METABOLIC PANEL - Normal    Sodium 143      Potassium 4.5      Chloride 106      Carbon Dioxide (CO2) 28      Anion Gap 9      Urea Nitrogen 9.9      Creatinine 0.61      Calcium 9.4      Glucose 97      GFR Estimate >90     TROPONIN T, HIGH SENSITIVITY - Normal    Troponin T, High Sensitivity 9     TROPONIN T, HIGH SENSITIVITY - Normal    Troponin T, High Sensitivity 9         Procedures:      Emergency Department Course:           Reviewed:    I reviewed nursing notes, vitals and past history    Assessments/Consults:   ED Course as of 10/17/22 0056   Sun Oct 16, 2022   2054 Dr. Muro' evaluation.   2312 Rechecked       Interventions:  Medications   lisinopril (ZESTRIL) tablet 40 mg (40 mg Oral Given 10/16/22 2041)   hydrochlorothiazide (MICROZIDE) capsule 12.5 mg (12.5 mg Oral Given 10/16/22 2338)       Disposition:  The patient was discharged to home.    Impression & Plan        CMS Diagnoses:           Medical Decision Making:    This 67-year-old female patient presents  to the ED due to asymptomatic hypertension.  Please see the HPI and exam for specifics.    The patient remained completely asymptomatic during her entire ED stay.  I note that her heart rate was in the bradycardic range.  The first EKG raise the question of an AV block though I think this was an over read on the part of the machine.  The second EKG does not show a second-degree AV block.  The patient takes 40 mg of lisinopril every night and had not taken it prior to coming in.  She was given that dose but her blood pressure remained elevated in the emergency department.  Given her lack of symptoms, I think that discharge and outpatient follow-up for her asymptomatic hypertension is reasonable.  I did add hydrochlorothiazide to her medication regimen but further discussion should be had in the primary care clinic.  She can return at anytime with new or worsening symptoms.        Diagnosis:    ICD-10-CM    1. Asymptomatic hypertension  I10           Discharge Medications:  Discharge Medication List as of 10/16/2022 11:46 PM      START taking these medications    Details   hydrochlorothiazide (HYDRODIURIL) 12.5 MG tablet Take 1 tablet (12.5 mg) by mouth daily for 30 days, Disp-30 tablet, R-0, E-Prescribe                    Holden Muro, DO  10/17/22 0057

## 2022-10-17 NOTE — ED TRIAGE NOTES
Sent from [facility] for htn. Pt is on 40mg lisinopril, last dose last evening. Denies headache, vision changes, chest pain, or shortness of breath     Triage Assessment     Row Name 10/16/22 2013       Triage Assessment (Adult)    Airway WDL WDL       Cognitive/Neuro/Behavioral WDL    Cognitive/Neuro/Behavioral WDL WDL